# Patient Record
Sex: MALE | Race: OTHER | NOT HISPANIC OR LATINO | URBAN - METROPOLITAN AREA
[De-identification: names, ages, dates, MRNs, and addresses within clinical notes are randomized per-mention and may not be internally consistent; named-entity substitution may affect disease eponyms.]

---

## 2020-09-23 ENCOUNTER — EMERGENCY (EMERGENCY)
Facility: HOSPITAL | Age: 35
LOS: 1 days | Discharge: HOME | End: 2020-09-23
Attending: EMERGENCY MEDICINE
Payer: COMMERCIAL

## 2020-09-23 VITALS
HEIGHT: 68 IN | DIASTOLIC BLOOD PRESSURE: 92 MMHG | TEMPERATURE: 99 F | HEART RATE: 95 BPM | OXYGEN SATURATION: 98 % | SYSTOLIC BLOOD PRESSURE: 150 MMHG | RESPIRATION RATE: 18 BRPM | WEIGHT: 190.04 LBS

## 2020-09-23 VITALS
RESPIRATION RATE: 18 BRPM | HEART RATE: 90 BPM | DIASTOLIC BLOOD PRESSURE: 98 MMHG | OXYGEN SATURATION: 100 % | SYSTOLIC BLOOD PRESSURE: 136 MMHG

## 2020-09-23 PROCEDURE — 99284 EMERGENCY DEPT VISIT MOD MDM: CPT

## 2020-09-23 PROCEDURE — 96372 THER/PROPH/DIAG INJ SC/IM: CPT

## 2020-09-23 PROCEDURE — 73562 X-RAY EXAM OF KNEE 3: CPT | Mod: 26,RT

## 2020-09-23 PROCEDURE — 73562 X-RAY EXAM OF KNEE 3: CPT

## 2020-09-23 PROCEDURE — 99283 EMERGENCY DEPT VISIT LOW MDM: CPT | Mod: 25

## 2020-09-23 RX ORDER — KETOROLAC TROMETHAMINE 30 MG/ML
15 SYRINGE (ML) INJECTION ONCE
Refills: 0 | Status: DISCONTINUED | OUTPATIENT
Start: 2020-09-23 | End: 2020-09-23

## 2020-09-23 RX ADMIN — Medication 15 MILLIGRAM(S): at 20:35

## 2020-09-23 NOTE — ED PROVIDER NOTE - CLINICAL SUMMARY MEDICAL DECISION MAKING FREE TEXT BOX
R knee pain x 3 wk after twisting injury playing [employer].  NV intact.  Exam seems c/w MCL +/- meniscus given reported mechanical symptoms.  Possibly ACL though lachman and drawers WNL.  Needs XR knee, likely bulky torres, and sports f/u.  --BMM

## 2020-09-23 NOTE — ED PROVIDER NOTE - PLAN OF CARE
34 M with no hx presenting for right knee x 1 month. Unlikely fracture vs dislocation at this time. Likely MCL vs meniscus injury. No ACL signs. Plan for XR.

## 2020-09-23 NOTE — ED PROVIDER NOTE - PHYSICAL EXAMINATION
RLE: Right knee no effusion noted. No tenderness to palpation. Flexor/extensor comparments intact. (+) reproducible pain to valgus stress. (-)valgus stress. (-) anterior/posterior drawers test.

## 2020-09-23 NOTE — ED PROVIDER NOTE - PROGRESS NOTE DETAILS
XR negative. Likely MCL vs meniscus injury at this time. Pineda wrap placed. Will refer patient to sports medicine clinic for further management. Patient verbalizes understanding of plan. Strict return precautions.

## 2020-09-23 NOTE — ED PROVIDER NOTE - NS ED ROS FT
Constitutional: No fever or chills  Eyes: No visual changes, eye pain or redness  HEENT: No throat pain, ear pain, nasal pain. No nose bleeding.  CV: No chest pain or lower extremity edema  Resp: No SOB no cough  GI: No abd pain. No nausea or vomiting. No diarrhea. No constipation.   : No dysuria, hematuria.   MSK: (+) musculoskeletal pain  Skin: No rash  Neuro: No headache. No numbness or tingling. No weakness.

## 2020-09-23 NOTE — ED PROVIDER NOTE - NSFOLLOWUPCLINICS_GEN_ALL_ED_FT
[facility] Sports Medicine  Sports Medicine  [address] [place] [address]  Phone: [phone]  Fax:   Follow Up Time:

## 2020-09-23 NOTE — ED ADULT NURSE NOTE - CHIEF COMPLAINT
ACL labs have Culture results for this patient.  They need a call back whether the results have been seen or not.   The patient is a 34y Male complaining of knee pain/injury.

## 2020-09-23 NOTE — ED PROVIDER NOTE - NSFOLLOWUPINSTRUCTIONS_ED_ALL_ED_FT
Follow up with the [facility] on Tuesday, 9/29/20.  Hours are from 2p-6p.  [phone] for an appointment.    Motrin 400 mg every 8 hours as needed for pain    Keep your knee wrap on and in place until you see the sports medicine doctors    Return to the ER for worsening pain, weakness, or ANY other concerns

## 2020-09-23 NOTE — ED PROVIDER NOTE - OBJECTIVE STATEMENT
The patient is a 34 year old male wit hx of HTN presenting for right knee pain x 1 month. Symptoms started suddenly s/p playing soccer, have been constant and worsening, with no worsening or alleviating factors. No medications taken. Patient denies chest pain, sob, fever, chills, headache, nausea, emesis, diarrhea, abdominal pain, hematuria/dysuria or lower extremity swelling.

## 2020-09-23 NOTE — ED PROVIDER NOTE - CARE PLAN
Principal Discharge DX:	Knee pain, right   Principal Discharge DX:	Knee pain, right  Assessment and plan of treatment:	34 M with no hx presenting for right knee x 1 month. Unlikely fracture vs dislocation at this time. Likely MCL vs meniscus injury. No ACL signs. Plan for XR.

## 2020-09-23 NOTE — ED ADULT NURSE NOTE - CHPI ED NUR CONTEXT2
[facility] EMERGENCY DEPT 
[address] [facility] [address] [facility] [address] [address] [id number] 
[phone] Work/School Note Date: 6/12/2019 To Whom It May concern: 
 
Rashel Daly was seen and treated today in the emergency room by the following provider(s): 
Attending Provider: Mehul Díaz MD.   
 
Rashel Daly may return to school on Monday, 6/17/2019 or sooner if better. Sincerely, Cullen Atkinson RN 
 
 
 
 sports related

## 2020-09-23 NOTE — ED ADULT NURSE NOTE - OBJECTIVE STATEMENT
The pt is a 33 y/o M PMH HTN presenting to the ED c/o right knee pain x 1 month. The patient reports he "hurt his knee playing soccer," pt reports no relief in pain and denies taking pain medication. Upon assessment, pt is AO x 4, speaking in full and complete sentences, s1 s2 heart sounds, abd soft and nontender, bowel sounds present in all four quadrants, limited ROM in right knee skin warm, dry and intact, present peripheral pulses, PERRL. Pt denies fever, chills, n/v, urinary symptoms, CP, dizziness, weakness, HA, SOB, abd pain. Pt positioned for comfort, appropriate side rails raised, wheels locked, bed in lowest position, pt denies needs at this time.

## 2020-09-29 ENCOUNTER — APPOINTMENT (OUTPATIENT)
Dept: SPORTS MEDICINE | Facility: CLINIC | Age: 35
End: 2020-09-29
Payer: COMMERCIAL

## 2020-09-29 DIAGNOSIS — Z78.9 OTHER SPECIFIED HEALTH STATUS: ICD-10-CM

## 2020-09-29 PROBLEM — Z00.00 ENCOUNTER FOR PREVENTIVE HEALTH EXAMINATION: Status: ACTIVE | Noted: 2020-09-29

## 2020-09-29 PROCEDURE — 99214 OFFICE O/P EST MOD 30 MIN: CPT | Mod: 25

## 2020-09-29 PROCEDURE — 99204 OFFICE O/P NEW MOD 45 MIN: CPT | Mod: 25

## 2020-09-29 PROCEDURE — 20610 DRAIN/INJ JOINT/BURSA W/O US: CPT

## 2020-09-29 NOTE — DISCUSSION/SUMMARY
[de-identified] : Attending note. Impression: #1 right knee pain, #2 possible internal arrangement of the knee. The patient received a steroid injection of the knee today. He will continue using NSAIDs or ibuprofen, or Tylenol if necessary. Steroid flare was discussed with the patient. He will return to the office in 2 weeks' time for reevaluation. Would consider advanced imaging if he continues to have pain.

## 2020-09-29 NOTE — HISTORY OF PRESENT ILLNESS
[de-identified] : Attending note. This is a new patient visit for this 34-year-old male complaining of anterior lateral right knee pain for approximately 3 weeks. Patient states he had pain while playing soccer. He denies any swelling, or mechanical symptoms. He denies any other joint aches or pains. He denies any fever. He denies any rash. He denies any previous episodes. Patient was seen in the emergency department at [facility] on September 23 where x-rays were performed and read as negative. Pain is sharp and it is exacerbated by weightbearing.

## 2020-09-29 NOTE — PHYSICAL EXAM
[de-identified] : Attending note. Patient is alert and in no acute distress. Examination of the right knee reveals no swelling, effusion or deformity. There is no tenderness of the quad, quad tendon, patella, patellar tendon and tibial tuberosity. Patient has anterior lateral joint line tenderness. There is no tenderness over the LCL or MCL. Knee is stable when stressed. Lachman is negative. Geo is negative. Skin is normal. Sensation is normal. Distal pulses are intact. There is no popliteal tenderness. He has full range of motion of his knee with increased pain with knee flexion. [de-identified] : Attending note. Three-view x-ray of the right knee that was performed in the emergency department was reviewed and is negative.

## 2020-10-13 ENCOUNTER — APPOINTMENT (OUTPATIENT)
Dept: SPORTS MEDICINE | Facility: CLINIC | Age: 35
End: 2020-10-13
Payer: COMMERCIAL

## 2020-10-13 DIAGNOSIS — M23.91 UNSPECIFIED INTERNAL DERANGEMENT OF RIGHT KNEE: ICD-10-CM

## 2020-10-13 DIAGNOSIS — M67.869: ICD-10-CM

## 2020-10-13 PROCEDURE — 99214 OFFICE O/P EST MOD 30 MIN: CPT

## 2020-10-13 RX ORDER — MELOXICAM 7.5 MG/1
7.5 TABLET ORAL TWICE DAILY
Qty: 60 | Refills: 0 | Status: ACTIVE | COMMUNITY
Start: 2020-10-13 | End: 1900-01-01

## 2020-10-13 NOTE — HISTORY OF PRESENT ILLNESS
[de-identified] : 33 y/o male presenting for follow up for anterior lateral right knee pain. Patient states he had no relief from steroid knee injection or OTC tylenol. He state she has also been using a brace with no relief. He states he has been having persistent pain. No physical therapy. Denies any buckling or knee giving out. \par    Attending note. This is a followup visit for this 34-year-old male with right anterior knee pain. The patient previously received a steroid injection his knee. He had no steroid flare and also reports no significant improvement in his anterior knee pain. He denies any swelling. He has been taking Tylenol for pain. He denies any mechanical symptoms. Patient was also previously wearing a brace, however he had no significant improvement.

## 2020-10-13 NOTE — PHYSICAL EXAM
[de-identified] : Patient is alert and in no acute distress. Examination of the right knee reveals no swelling, effusion or deformity. Tenderness to proximal patellar tendon, and mild anterior lateral joint line tenderness. There is no tenderness of the quad, quad tendon, patella, and tibial tuberosity. There is no tenderness over the LCL or MCL. Knee is stable when stressed. Lachman is negative. Geo is negative. Skin is normal. Sensation is normal. Distal pulses are intact. There is no popliteal tenderness. He has full range of motion of his knee with increased pain with knee flexion. \par       Attending note. Patient is alert and in no acute distress. Examination of the right knee reveals no swelling, knee effusion or deformity. There is tenderness in the inferior pole of the right patella. There is also slight tenderness in the proximal end of the patellar tendon. There is no joint line tenderness. Patient has full range of motion of his knee with slight increase in pain with extreme knee flexion. Popliteal fossa is nontender. He is stable when stressed. Skin is normal. Sensation is normal. Distal pulses are intact. There is no leg edema. [de-identified] : Attending note. Point of care ultrasound was performed in the office today which shows slight swelling in the proximal end of the patellar tendon.

## 2020-10-13 NOTE — REASON FOR VISIT
[Follow-Up Visit] : a follow-up visit for [Initial Visit] : an initial visit for [Knee Pain] : knee pain

## 2021-04-15 ENCOUNTER — OUTPATIENT (OUTPATIENT)
Dept: OUTPATIENT SERVICES | Facility: HOSPITAL | Age: 36
LOS: 1 days | End: 2021-04-15
Payer: MEDICAID

## 2021-04-15 DIAGNOSIS — Z11.52 ENCOUNTER FOR SCREENING FOR COVID-19: ICD-10-CM

## 2021-04-15 LAB — SARS-COV-2 RNA SPEC QL NAA+PROBE: SIGNIFICANT CHANGE UP

## 2021-04-15 PROCEDURE — 87635 SARS-COV-2 COVID-19 AMP PRB: CPT

## 2021-07-08 ENCOUNTER — EMERGENCY (EMERGENCY)
Facility: HOSPITAL | Age: 36
LOS: 1 days | Discharge: HOME | End: 2021-07-08
Attending: EMERGENCY MEDICINE
Payer: MEDICAID

## 2021-07-08 VITALS
DIASTOLIC BLOOD PRESSURE: 96 MMHG | OXYGEN SATURATION: 98 % | TEMPERATURE: 98 F | SYSTOLIC BLOOD PRESSURE: 158 MMHG | WEIGHT: 195.11 LBS | HEART RATE: 87 BPM | RESPIRATION RATE: 16 BRPM | HEIGHT: 68 IN

## 2021-07-08 PROCEDURE — 99283 EMERGENCY DEPT VISIT LOW MDM: CPT | Mod: 25

## 2021-07-08 PROCEDURE — 96372 THER/PROPH/DIAG INJ SC/IM: CPT

## 2021-07-08 PROCEDURE — 87651 STREP A DNA AMP PROBE: CPT

## 2021-07-08 PROCEDURE — 87798 DETECT AGENT NOS DNA AMP: CPT

## 2021-07-08 PROCEDURE — 99284 EMERGENCY DEPT VISIT MOD MDM: CPT

## 2021-07-08 RX ORDER — DEXAMETHASONE 0.5 MG/5ML
6 ELIXIR ORAL ONCE
Refills: 0 | Status: COMPLETED | OUTPATIENT
Start: 2021-07-08 | End: 2021-07-08

## 2021-07-08 RX ORDER — KETOROLAC TROMETHAMINE 30 MG/ML
30 SYRINGE (ML) INJECTION ONCE
Refills: 0 | Status: DISCONTINUED | OUTPATIENT
Start: 2021-07-08 | End: 2021-07-08

## 2021-07-08 RX ADMIN — Medication 6 MILLIGRAM(S): at 13:56

## 2021-07-08 RX ADMIN — Medication 30 MILLIGRAM(S): at 13:57

## 2021-07-08 NOTE — ED PROVIDER NOTE - PHYSICAL EXAMINATION
Afebrile, hemodynamically stable, saturating well  NAD, well appearing, sitting comfortably in bed  Head NCAT  EOMI grossly, anicteric  MMM, b/l tonsillar enlargement, uvula midline, no exudates, no peritonsillar swelling  No cerv LAD  No stridor or hoarseness, speaking full sentences without issues  Breathing comfortably on RA  AAO, CN's 3-12 grossly intact  DONOHUE spontaneously  Skin warm, well perfused, no rashes or hives

## 2021-07-08 NOTE — ED PROVIDER NOTE - NSFOLLOWUPINSTRUCTIONS_ED_ALL_ED_FT
Please follow up with your primary care doctor and an ENT doctor in 1-2 days.  Please use ibuprofen or acetaminophen as needed for pain.  Please keep well hydrated.  Please return to the emergency department if you have worsening pain, trouble speaking or breathing, unable to keep hydrated, fever, or any other symptoms.      Sore Throat    When you have a sore throat, your throat may feel:  •Tender.  •Burning.  •Irritated.  •Scratchy.  •Painful when you swallow.  •Painful when you talk.    Many things can cause a sore throat, such as:  •An infection.  •Allergies.  •Dry air.  •Smoke or pollution.  •Radiation treatment.  •Gastroesophageal reflux disease (GERD).  •A tumor.    A sore throat can be the first sign of another sickness. It can happen with other problems, like:  •Coughing.  •Sneezing.  •Fever.  •Swelling in the neck.  Most sore throats go away without treatment.    Follow these instructions at home:    •Take over-the-counter medicines only as told by your doctor.   •If your child has a sore throat, do not give your child aspirin.  •Drink enough fluids to keep your pee (urine) pale yellow.  •Rest when you feel you need to.  •To help with pain:  •Sip warm liquids, such as broth, herbal tea, or warm water.  •Eat or drink cold or frozen liquids, such as frozen ice pops.  •Gargle with a salt-water mixture 3–4 times a day or as needed. To make a salt-water mixture, add ½–1 tsp (3–6 g) of salt to 1 cup (237 mL) of warm water. Mix it until you cannot see the salt anymore.  •Suck on hard candy or throat lozenges.  •Put a cool-mist humidifier in your bedroom at night.  •Sit in the bathroom with the door closed for 5–10 minutes while you run hot water in the shower.  • Do not use any products that contain nicotine or tobacco, such as cigarettes, e-cigarettes, and chewing tobacco. If you need help quitting, ask your doctor.  •Wash your hands well and often with soap and water. If soap and water are not available, use hand .    Contact a doctor if:  •You have a fever for more than 2–3 days.  •You keep having symptoms for more than 2–3 days.  •Your throat does not get better in 7 days.  •You have a fever and your symptoms suddenly get worse.  •Your child who is 3 months to 3 years old has a temperature of 102.2°F (39°C) or higher.    Get help right away if:  •You have trouble breathing.  •You cannot swallow fluids, soft foods, or your saliva.  •You have swelling in your throat or neck that gets worse.  •You keep feeling sick to your stomach (nauseous).  •You keep throwing up (vomiting)    Summary  •A sore throat is pain, burning, irritation, or scratchiness in the throat. Many things can cause a sore throat.  •Take over-the-counter medicines only as told by your doctor. Do not give your child aspirin.  •Drink plenty of fluids, and rest as needed.  •Contact a doctor if your symptoms get worse or your sore throat does not get better within 7 days.    This information is not intended to replace advice given to you by your health care provider. Make sure you discuss any questions you have with your health care provider.

## 2021-07-08 NOTE — ED PROVIDER NOTE - CARE PROVIDER_API CALL
Anson Etienne  OTOLARYNGOLOGY  [address] [place] [address], [address]  [place] [address]  Phone: [phone]  Fax: [phone]  Follow Up Time: 1-3 Days

## 2021-07-08 NOTE — ED PROVIDER NOTE - CLINICAL SUMMARY MEDICAL DECISION MAKING FREE TEXT BOX
Centor 1/4 for strep and low suspicion, strep sent. No e/o PTA or RPA. No stridor. No e/o airway compromise. Appears well hydrated. Patient is well appearing, NAD, afebrile, hemodynamically stable. Given Toradol, decadron. Discharged with instructions in further symptomatic care, return precautions, and need for PMD and ENT f/u.

## 2021-07-08 NOTE — ED PROVIDER NOTE - OBJECTIVE STATEMENT
Serbian [id number].  35yoM prev healthy presents with feeling of generalized throat pain like his tonsils are swollen x 4-5 days. Hurts worse with swallowing. Has had similar symptoms x 1 yr and was told by his PMD that was 2/2 reflux and given omeprazole. States is still eating and drinking well. Denies fever, chills, cough, congestion, rhinorrhea, and all other symptoms.

## 2021-07-08 NOTE — ED PROVIDER NOTE - PATIENT PORTAL LINK FT
You can access the FollowMyHealth Patient Portal offered by [facility] by registering at the following website: [email or web address]. By joining [facility]’s FollowMyHealth portal, you will also be able to view your health information using other applications (apps) compatible with our system.

## 2021-07-09 LAB — S PYO DNA THROAT QL NAA+PROBE: SIGNIFICANT CHANGE UP

## 2023-09-28 NOTE — PROCEDURE
Patient called and stated that the ozempic 0.5 mg weekly has caused nausea. Informed patient to take his next shot in the leg advised to treat symptoms of nausea with zofran and to make sure his diet has plenty of protein and clear liquids. Per Dr. Kalpana Hamilton if these recommendations do not help he is to call back and let us know.
[de-identified] : Attending note. Using landmarks. The right knee was injected with 1 cc of 40 mg Depo-Medrol +4 cc 1% plain lidocaine. Procedure was tolerated well. Patient had reduction of his pain post injection.